# Patient Record
Sex: FEMALE | Race: WHITE | Employment: FULL TIME | URBAN - METROPOLITAN AREA
[De-identification: names, ages, dates, MRNs, and addresses within clinical notes are randomized per-mention and may not be internally consistent; named-entity substitution may affect disease eponyms.]

---

## 2018-03-06 ENCOUNTER — TELEPHONE (OUTPATIENT)
Dept: FAMILY MEDICINE CLINIC | Age: 53
End: 2018-03-06

## 2018-03-06 ENCOUNTER — OFFICE VISIT (OUTPATIENT)
Dept: FAMILY MEDICINE CLINIC | Age: 53
End: 2018-03-06

## 2018-03-06 VITALS
TEMPERATURE: 98.2 F | OXYGEN SATURATION: 98 % | HEIGHT: 68 IN | RESPIRATION RATE: 17 BRPM | DIASTOLIC BLOOD PRESSURE: 78 MMHG | BODY MASS INDEX: 33.43 KG/M2 | WEIGHT: 220.6 LBS | SYSTOLIC BLOOD PRESSURE: 125 MMHG | HEART RATE: 87 BPM

## 2018-03-06 DIAGNOSIS — R05.8 POST-VIRAL COUGH SYNDROME: Primary | ICD-10-CM

## 2018-03-06 DIAGNOSIS — Z87.09 HISTORY OF CHRONIC BRONCHITIS: ICD-10-CM

## 2018-03-06 RX ORDER — BENZONATATE 200 MG/1
200 CAPSULE ORAL
Qty: 20 CAP | Refills: 0 | Status: SHIPPED | OUTPATIENT
Start: 2018-03-06 | End: 2018-03-13

## 2018-03-06 RX ORDER — MONTELUKAST SODIUM 10 MG/1
10 TABLET ORAL DAILY
Qty: 10 TAB | Refills: 0 | Status: SHIPPED | OUTPATIENT
Start: 2018-03-06 | End: 2018-03-26 | Stop reason: SDUPTHER

## 2018-03-06 NOTE — PROGRESS NOTES
Chief Complaint   Patient presents with    URI     [facility] 2 weeks ago--neg for flu and bronchitis     1. Have you been to the ER, urgent care clinic since your last visit? Hospitalized since your last visit? No    2. Have you seen or consulted any other health care providers outside of the [address] [place] [address] since your last visit? Include any pap smears or colon screening.  No      Had chest xray--bronchitis--last friday    Hard time taking a deep breath

## 2018-03-06 NOTE — TELEPHONE ENCOUNTER
Patient has appt with Pulmonary Disease    Dr Fely Valentino  April 10th at 10:45am  Phone [phone]  Fax [phone]

## 2018-03-06 NOTE — PATIENT INSTRUCTIONS
Viral Respiratory Infection: Care Instructions  Your Care Instructions    Viruses are very small organisms. They grow in number after they enter your body. There are many types that cause different illnesses, such as colds and the mumps. The symptoms of a viral respiratory infection often start quickly. They include a fever, sore throat, and runny nose. You may also just not feel well. Or you may not want to eat much. Most viral respiratory infections are not serious. They usually get better with time and self-care. Antibiotics are not used to treat a viral infection. That's because antibiotics will not help cure a viral illness. In some cases, antiviral medicine can help your body fight a serious viral infection. Follow-up care is a key part of your treatment and safety. Be sure to make and go to all appointments, and call your doctor if you are having problems. It's also a good idea to know your test results and keep a list of the medicines you take. How can you care for yourself at home? · Rest as much as possible until you feel better. · Be safe with medicines. Take your medicine exactly as prescribed. Call your doctor if you think you are having a problem with your medicine. You will get more details on the specific medicine your doctor prescribes. · Take an over-the-counter pain medicine, such as acetaminophen (Tylenol), ibuprofen (Advil, Motrin), or naproxen (Aleve), as needed for pain and fever. Read and follow all instructions on the label. Do not give aspirin to anyone younger than 20. It has been linked to Reye syndrome, a serious illness. · Drink plenty of fluids, enough so that your urine is light yellow or clear like water. Hot fluids, such as tea or soup, may help relieve congestion in your nose and throat. If you have kidney, heart, or liver disease and have to limit fluids, talk with your doctor before you increase the amount of fluids you drink.   · Try to clear mucus from your lungs by breathing deeply and coughing. · Gargle with warm salt water once an hour. This can help reduce swelling and throat pain. Use 1 teaspoon of salt mixed in 1 cup of warm water. · Do not smoke or allow others to smoke around you. If you need help quitting, talk to your doctor about stop-smoking programs and medicines. These can increase your chances of quitting for good. To avoid spreading the virus  · Cough or sneeze into a tissue. Then throw the tissue away. · If you don't have a tissue, use your hand to cover your cough or sneeze. Then clean your hand. You can also cough into your sleeve. · Wash your hands often. Use soap and warm water. Wash for 15 to 20 seconds each time. · If you don't have soap and water near you, you can clean your hands with alcohol wipes or gel. When should you call for help? Call your doctor now or seek immediate medical care if:  ? · You have a new or higher fever. ? · Your fever lasts more than 48 hours. ? · You have trouble breathing. ? · You have a fever with a stiff neck or a severe headache. ? · You are sensitive to light. ? · You feel very sleepy or confused. ? Watch closely for changes in your health, and be sure to contact your doctor if:  ? · You do not get better as expected. Where can you learn more? Go to http://naty-abdulaziz.info/. Enter F771 in the search box to learn more about \"Viral Respiratory Infection: Care Instructions. \"  Current as of: May 12, 2017  Content Version: 11.4  © 0440-0228 [employer]. Care instructions adapted under license by [employer] (which disclaims liability or warranty for this information). If you have questions about a medical condition or this instruction, always ask your healthcare professional. [address] any warranty or liability for your use of this information.

## 2018-03-06 NOTE — MR AVS SNAPSHOT
[address] 
[phone] Patient: Joi Ramos MRN: [id number] PYI:[id number] Visit Information Date & Time Provider Department Dept. Phone Encounter #  
 3/6/2018  1:30 PM Lacey Be, [facility] [phone] [id number] Upcoming Health Maintenance Date Due  
 BREAST CANCER SCRN MAMMOGRAM 9/13/2015 FOBT Q 1 YEAR AGE 50-75 9/13/2015 Influenza Age 5 to Adult 8/1/2017 PAP AKA CERVICAL CYTOLOGY 6/4/2018 DTaP/Tdap/Td series (2 - Td) 6/4/2025 Allergies as of 3/6/2018  Review Complete On: 3/6/2018 By: Magali Knight MD  
  
 Severity Noted Reaction Type Reactions Imitrex [Sumatriptan Succinate]  07/15/2010    Unknown (comments) Morphine  12/21/2011    Itching Sulfa (Sulfonamide Antibiotics)  07/15/2010    Nausea Only, Other (comments) \"dizziness\" Tetracycline  07/15/2010    Nausea Only, Other (comments) \"dizzness\" Current Immunizations  Never Reviewed Name Date Pneumococcal Polysaccharide (PPSV-23) 6/4/2015 Tdap 6/4/2015 Not reviewed this visit You Were Diagnosed With   
  
 Codes Comments Post-viral cough syndrome    -  Primary ICD-10-CM: N49 ICD-9-CM: 786.2 History of chronic bronchitis     ICD-10-CM: Z87.09 
ICD-9-CM: V12.69 Vitals BP Pulse Temp Resp Height(growth percentile) Weight(growth percentile) 125/78 87 98.2 °F (36.8 °C) (Oral) 17 5' 8\" (1.727 m) 220 lb 9.6 oz (100.1 kg) SpO2 BMI OB Status Smoking Status 98% 33.54 kg/m2 Hysterectomy Former Smoker Vitals History BMI and BSA Data Body Mass Index Body Surface Area  
 33.54 kg/m 2 2.19 m 2 Preferred Pharmacy Pharmacy Name Phone [facility] [address] [place] [address] [phone] Your Updated Medication List  
  
   
 This list is accurate as of 3/6/18  2:08 PM.  Always use your most recent med list.  
  
  
  
  
 benzonatate 200 mg capsule Commonly known as:  TESSALON Take 1 Cap by mouth three (3) times daily as needed for Cough for up to 7 days. citalopram 20 mg tablet Commonly known as:  Arna Spurling Take 1 Tab by mouth daily. montelukast 10 mg tablet Commonly known as:  SINGULAIR Take 1 Tab by mouth daily. Prescriptions Sent to Pharmacy Refills  
 benzonatate (TESSALON) 200 mg capsule 0 Sig: Take 1 Cap by mouth three (3) times daily as needed for Cough for up to 7 days. Class: Normal  
 Pharmacy: [facility] [address] [place] [address] Ph #: [phone] Route: Oral  
 montelukast (SINGULAIR) 10 mg tablet 0 Sig: Take 1 Tab by mouth daily. Class: Normal  
 Pharmacy: [facility] [address] [place] [address] Ph #: [phone] Route: Oral  
  
We Performed the Following REFERRAL TO PULMONARY DISEASE [MYV25 Custom] Referral Information Referral ID Referred By Referred To  
  
 [id number] Maria Guadalupe PIERCE Not Available Visits Status Start Date End Date 1 New Request 3/6/18 3/6/19 If your referral has a status of pending review or denied, additional information will be sent to support the outcome of this decision. Patient Instructions Viral Respiratory Infection: Care Instructions Your Care Instructions Viruses are very small organisms. They grow in number after they enter your body. There are many types that cause different illnesses, such as colds and the mumps. The symptoms of a viral respiratory infection often start quickly. They include a fever, sore throat, and runny nose. You may also just not feel well. Or you may not want to eat much. Most viral respiratory infections are not serious. They usually get better with time and self-care. Antibiotics are not used to treat a viral infection. That's because antibiotics will not help cure a viral illness. In some cases, antiviral medicine can help your body fight a serious viral infection. Follow-up care is a key part of your treatment and safety. Be sure to make and go to all appointments, and call your doctor if you are having problems. It's also a good idea to know your test results and keep a list of the medicines you take. How can you care for yourself at home? · Rest as much as possible until you feel better. · Be safe with medicines. Take your medicine exactly as prescribed. Call your doctor if you think you are having a problem with your medicine. You will get more details on the specific medicine your doctor prescribes. · Take an over-the-counter pain medicine, such as acetaminophen (Tylenol), ibuprofen (Advil, Motrin), or naproxen (Aleve), as needed for pain and fever. Read and follow all instructions on the label. Do not give aspirin to anyone younger than 20. It has been linked to Reye syndrome, a serious illness. · Drink plenty of fluids, enough so that your urine is light yellow or clear like water. Hot fluids, such as tea or soup, may help relieve congestion in your nose and throat. If you have kidney, heart, or liver disease and have to limit fluids, talk with your doctor before you increase the amount of fluids you drink. · Try to clear mucus from your lungs by breathing deeply and coughing. · Gargle with warm salt water once an hour. This can help reduce swelling and throat pain. Use 1 teaspoon of salt mixed in 1 cup of warm water. · Do not smoke or allow others to smoke around you. If you need help quitting, talk to your doctor about stop-smoking programs and medicines. These can increase your chances of quitting for good. To avoid spreading the virus · Cough or sneeze into a tissue. Then throw the tissue away. · If you don't have a tissue, use your hand to cover your cough or sneeze. Then clean your hand. You can also cough into your sleeve. · Wash your hands often. Use soap and warm water. Wash for 15 to 20 seconds each time. · If you don't have soap and water near you, you can clean your hands with alcohol wipes or gel. When should you call for help? Call your doctor now or seek immediate medical care if: 
? · You have a new or higher fever. ? · Your fever lasts more than 48 hours. ? · You have trouble breathing. ? · You have a fever with a stiff neck or a severe headache. ? · You are sensitive to light. ? · You feel very sleepy or confused. ? Watch closely for changes in your health, and be sure to contact your doctor if: 
? · You do not get better as expected. Where can you learn more? Go to http://naty-abdulaziz.info/. Enter J696 in the search box to learn more about \"Viral Respiratory Infection: Care Instructions. \" Current as of: May 12, 2017 Content Version: 11.4 © 0049-6104 [employer]. Care instructions adapted under license by [employer] (which disclaims liability or warranty for this information). If you have questions about a medical condition or this instruction, always ask your healthcare professional. [address] any warranty or liability for your use of this information. Introducing \[facility] [place] [facility]\"" & HEALTH SERVICES! Santos Bernal introduces Experifun patient portal. Now you can access parts of your medical record, email your doctor's office, and request medication refills online. 1. In your internet browser, go to [email or web address] 2. Click on the First Time User? Click Here link in the Sign In box. You will see the New Member Sign Up page. 3. Enter your Experifun Access Code exactly as it appears below. You will not need to use this code after youve completed the sign-up process.  If you do not sign up before the expiration date, you must request a new code. · Zumper Access Code: [facility] Expires: 6/4/2018  2:08 PM 
 
4. Enter the last four digits of your Social Security Number (xxxx) and Date of Birth (mm/dd/yyyy) as indicated and click Submit. You will be taken to the next sign-up page. 5. Create a Zumper ID. This will be your Zumper login ID and cannot be changed, so think of one that is secure and easy to remember. 6. Create a Zumper password. You can change your password at any time. 7. Enter your Password Reset Question and Answer. This can be used at a later time if you forget your password. 8. Enter your e-mail address. You will receive e-mail notification when new information is available in [address] 9. Click Sign Up. You can now view and download portions of your medical record. 10. Click the Download Summary menu link to download a portable copy of your medical information. If you have questions, please visit the Frequently Asked Questions section of the Zumper website. Remember, Zumper is NOT to be used for urgent needs. For medical emergencies, dial 911. Now available from your iPhone and Android! Please provide this summary of care documentation to your next provider. Your primary care clinician is listed as Parminder Zeng. If you have any questions after today's visit, please call [phone].

## 2018-03-06 NOTE — PROGRESS NOTES
HPI     Chief Complaint   Patient presents with    URI     [facility] 2 weeks ago--neg for flu and bronchitis    Cough     She is a 46 y.o. female who presents for cough for 2 weeks. She was initially seen at urgent care and was given azithtromycin. She felt better. However a few days later, she was seen at an urgent care on 3/2/18 and was diagnosed with bronchitis. Her flu test was negative. Her CXR showed chronic perihilar changes. She was given prednisone, albuterol inhaler and tessalon pearls. Today she complains of nonproductive cough and subjective shortness of breath. She states there is no improvement with prednisone and albuterol inhaler. No fever or chills. She went to the gym yesterday and did cross fit without any problems. She notes remote history of cigarette use. Records obtained and reviewed from [facility] - as stated above. Review of Systems   Constitutional: Negative for chills, diaphoresis, fatigue and fever. HENT: Negative for congestion, postnasal drip and sore throat. Eyes: Negative for discharge. Respiratory: Positive for cough and shortness of breath. Negative for chest tightness and wheezing. Cardiovascular: Negative for chest pain and palpitations. Gastrointestinal: Negative for abdominal pain, diarrhea, nausea and vomiting. Musculoskeletal: Negative for myalgias. Skin: Negative for color change. Neurological: Negative for headaches. Psychiatric/Behavioral: Negative for agitation. Physical Exam:  Visit Vitals    /78    Pulse 87    Temp 98.2 °F (36.8 °C) (Oral)    Resp 17    Ht 5' 8\" (1.727 m)    Wt 220 lb 9.6 oz (100.1 kg)    SpO2 98%    BMI 33.54 kg/m2     Physical Exam   Constitutional: She is oriented to person, place, and time. She appears well-developed and well-nourished. No distress. HENT:   Head: Normocephalic and atraumatic.    Right Ear: External ear normal.   Left Ear: External ear normal.   Nose: Nose normal.   Mouth/Throat: Oropharynx is clear and moist. No oropharyngeal exudate. Eyes: Conjunctivae are normal.   Neck: Neck supple. Cardiovascular: Normal rate and regular rhythm. No murmur heard. Pulmonary/Chest: Effort normal and breath sounds normal. She has no wheezes. She has no rales. Abdominal: Soft. Bowel sounds are normal. She exhibits no distension. There is no tenderness. Neurological: She is alert and oriented to person, place, and time. Skin: Skin is warm and dry. Psychiatric: She has a normal mood and affect. Vitals reviewed. Assessment / Plan   Diagnoses and all orders for this visit:    1. Post-viral cough syndrome  -     benzonatate (TESSALON) 200 mg capsule; Take 1 Cap by mouth three (3) times daily as needed for Cough for up to 7 days. -     montelukast (SINGULAIR) 10 mg tablet; Take 1 Tab by mouth daily. 2. History of chronic bronchitis - chronic perihilar findings on CXR  -     REFERRAL TO PULMONARY DISEASE       Follow-up Disposition:  Return if symptoms worsen or fail to improve. I have discussed the diagnosis with the patient and the intended plan as seen in the above orders. The patient has received an after-visit summary and questions were answered concerning future plans. I have discussed medication side effects and warnings with the patient as well.     Any Love MD  Family Medicine Resident

## 2018-03-26 DIAGNOSIS — R05.8 POST-VIRAL COUGH SYNDROME: ICD-10-CM

## 2018-03-27 RX ORDER — MONTELUKAST SODIUM 10 MG/1
TABLET ORAL
Qty: 10 TAB | Refills: 0 | Status: SHIPPED | OUTPATIENT
Start: 2018-03-27

## 2018-07-16 ENCOUNTER — DOCUMENTATION ONLY (OUTPATIENT)
Dept: FAMILY MEDICINE CLINIC | Age: 53
End: 2018-07-16

## 2018-07-16 NOTE — PROGRESS NOTES
7/16/2018 5:19 PM    Evaluated by Anny Mijares 7/10/18. Instructed to continue Breo Ellipta 200-25mcg/INR 1 puff daily for SOB, cough.

## 2022-07-15 ENCOUNTER — OFFICE VISIT (OUTPATIENT)
Dept: ORTHOPEDIC SURGERY | Age: 57
End: 2022-07-15
Payer: COMMERCIAL

## 2022-07-15 VITALS — BODY MASS INDEX: 31.35 KG/M2 | HEIGHT: 70 IN | WEIGHT: 219 LBS

## 2022-07-15 DIAGNOSIS — S83.241A ACUTE MEDIAL MENISCUS TEAR OF RIGHT KNEE, INITIAL ENCOUNTER: ICD-10-CM

## 2022-07-15 DIAGNOSIS — M22.41 CHONDROMALACIA OF RIGHT PATELLA: ICD-10-CM

## 2022-07-15 DIAGNOSIS — M25.561 RIGHT KNEE PAIN, UNSPECIFIED CHRONICITY: Primary | ICD-10-CM

## 2022-07-15 PROCEDURE — 99213 OFFICE O/P EST LOW 20 MIN: CPT | Performed by: ORTHOPAEDIC SURGERY

## 2022-07-15 RX ORDER — ZINC GLUCONATE 50 MG
TABLET ORAL
COMMUNITY

## 2022-07-15 NOTE — PROGRESS NOTES
Curtis Michel (: 1965) is a 64 y.o. female, established patient, here for evaluation of the following chief complaint(s):  Knee Pain       ASSESSMENT/PLAN:  Below is the assessment and plan developed based on review of pertinent history, physical exam, labs, studies, and medications. Findings were discussed with the patient today. We will obtain an MRI which will help us with further treatment planning including the possibility of surgical treatment. Patient will follow up after this MRI is performed. 1. Right knee pain, unspecified chronicity  -     XR KNEE RT 3 V; Future  2. Acute medial meniscus tear of right knee, initial encounter  3. Chondromalacia of right patella      Return for imaging results after study performed. SUBJECTIVE/OBJECTIVE:  Curtis Michel (: 1965) is a 64 y.o. female. She is very active at the gym and notes that she was doing a squat recently when she felt a sharp tear in her knee. This caused sharp aching anterior medial knee pain. Now she has difficulty with any sort of twist or pivot activities. She has tried activity modifications, ice, and anti-inflammatories with minimal relief. She has a history of a right knee arthroscopy. Allergies   Allergen Reactions    Imitrex [Sumatriptan Succinate] Unknown (comments)    Morphine Itching    Sulfa (Sulfonamide Antibiotics) Nausea Only and Other (comments)     \"dizziness\"    Tetracycline Nausea Only and Other (comments)     \"dizzness\"       Current Outpatient Medications   Medication Sig    fluticasone propion/salmeterol (WIXELA INHUB IN) Take  by inhalation.  Magnesium Oxide 500 mg cap Take  by mouth.  citalopram (CELEXA) 20 mg tablet Take 1 Tab by mouth daily.  montelukast (SINGULAIR) 10 mg tablet TAKE 1 TABLET BY MOUTH EVERY DAY (Patient not taking: Reported on 7/15/2022)     No current facility-administered medications for this visit.        Social History     Socioeconomic History    Marital status:      Spouse name: Not on file    Number of children: Not on file    Years of education: Not on file    Highest education level: Not on file   Occupational History    Not on file   Tobacco Use    Smoking status: Former Smoker    Smokeless tobacco: Never Used   Substance and Sexual Activity    Alcohol use: No    Drug use: No    Sexual activity: Not on file   Other Topics Concern    Not on file   Social History Narrative    Not on file     Social Determinants of Health     Financial Resource Strain:     Difficulty of Paying Living Expenses: Not on file   Food Insecurity:     Worried About [address] in the Last Year: Not on file    [address] in the Last Year: Not on file   Transportation Needs:     Lack of Transportation (Medical): Not on file    Lack of Transportation (Non-Medical):  Not on file   Physical Activity:     Days of Exercise per Week: Not on file    Minutes of Exercise per Session: Not on file   Stress:     Feeling of Stress : Not on file   Social Connections:     Frequency of Communication with Friends and Family: Not on file    Frequency of Social Gatherings with Friends and Family: Not on file    Attends Restorationism Services: Not on file    Active Member of [address] [place] [address] or Organizations: Not on file    Attends Club or Organization Meetings: Not on file    Marital Status: Not on file   Intimate Partner Violence:     Fear of Current or Ex-Partner: Not on file    Emotionally Abused: Not on file    Physically Abused: Not on file    Sexually Abused: Not on file   Housing Stability:     Unable to Pay for Housing in the Last Year: Not on file    Number of [place] in the Last Year: Not on file    Unstable Housing in the Last Year: Not on file       Past Surgical History:   Procedure Laterality Date    HX HYSTERECTOMY      HX WISDOM TEETH EXTRACTION         Family History   Problem Relation Age of Onset    Hypertension Father    Philomena Alcohol abuse Father     Cancer Maternal Aunt     Cancer Maternal Grandmother     OSTEOARTHRITIS Maternal Grandmother         OB History    No obstetric history on file. REVIEW OF SYSTEMS:  ROS     Positive for: Musculoskeletal    Last edited by Mihir Hernandez on 7/15/2022  1:54 PM. (History)        Patient denies any recent fever, chills, nausea, vomiting, chest pain, or shortness of breath. Vitals:  Ht 5' 10\" (1.778 m)   Wt 219 lb (99.3 kg)   BMI 31.42 kg/m²    Body mass index is 31.42 kg/m². PHYSICAL EXAM:  General exam: Patient is awake, alert, and oriented x3. Well-appearing. No acute distress. Ambulates with an antalgic gait    Right knee: Neurovascular and sensory intact. There is tenderness to palpation along the medial joint line. Mild effusion is present. There is pain with Deacon's maneuver. No obvious instability on ligamentous testing including Lachman's exam.  Stable anterior and posterior drawer. No erythema or ecchymosis. IMAGING:    XR Results (most recent):  Results from Hospital Encounter encounter on 01/23/09    XR LUMBAR SPINE 2 OR 3 VIEWS    Narrative            ICD Codes / Adm. Diagnosis:    /   BACK PAIN  Examination:  Mily Jung  - [id number] - Jan 23 2009  8:55AM    Accession No:  [id number]  Reason:  REASON: PAIN      REPORT:  Three views of the lumbar spine demonstrate normal alignment without  evidence of acute fracture or subluxation. IMPRESSION: No acute process. Interpreting/Reading Doctor: Katalina Borrego ([id number])  Transcribed: n/a on 01/23/2009  Approved: Katalina Borrego ([id number])  01/23/2009        Distribution:  Attending Doctor: Jody Pardo Doctor: Candelario Owens         Orders Placed This Encounter    XR KNEE RT 3 V     1     Standing Status:   Future     Number of Occurrences:   1     Standing Expiration Date:   7/16/2023              An electronic signature was used to authenticate this note.   -- Christian Bryant DO

## 2022-07-15 NOTE — LETTER
7/15/2022    Patient: Keisha Ventura   YOB: 1965   Date of Visit: 7/15/2022     Marcela Russell MD  [address] [address]  Via Fax: [phone]    Dear Marcela Russell MD,      Thank you for referring Ms. Gee Mcleod to [facility] for evaluation. My notes for this consultation are attached. If you have questions, please do not hesitate to call me. I look forward to following your patient along with you.       Sincerely,    Elza Lassiter, DO

## 2022-07-29 ENCOUNTER — HOSPITAL ENCOUNTER (OUTPATIENT)
Dept: MRI IMAGING | Age: 57
Discharge: HOME | End: 2022-07-29
Attending: ORTHOPAEDIC SURGERY
Payer: COMMERCIAL

## 2022-07-29 DIAGNOSIS — S83.241A ACUTE MEDIAL MENISCUS TEAR OF RIGHT KNEE, INITIAL ENCOUNTER: ICD-10-CM

## 2022-07-29 PROCEDURE — 73721 MRI JNT OF LWR EXTRE W/O DYE: CPT

## 2022-08-08 ENCOUNTER — OFFICE VISIT (OUTPATIENT)
Dept: ORTHOPEDIC SURGERY | Age: 57
End: 2022-08-08
Payer: COMMERCIAL

## 2022-08-08 VITALS — WEIGHT: 219 LBS | HEIGHT: 70 IN | BODY MASS INDEX: 31.35 KG/M2

## 2022-08-08 DIAGNOSIS — S83.281A ACUTE LATERAL MENISCAL TEAR, RIGHT, INITIAL ENCOUNTER: ICD-10-CM

## 2022-08-08 DIAGNOSIS — M22.41 CHONDROMALACIA OF RIGHT PATELLA: Primary | ICD-10-CM

## 2022-08-08 PROCEDURE — 20610 DRAIN/INJ JOINT/BURSA W/O US: CPT | Performed by: ORTHOPAEDIC SURGERY

## 2022-08-08 RX ORDER — AMOXICILLIN 875 MG/1
TABLET, FILM COATED ORAL
COMMUNITY

## 2022-08-08 RX ORDER — IBUPROFEN 800 MG/1
TABLET ORAL
COMMUNITY

## 2022-08-08 RX ORDER — CEFDINIR 300 MG/1
CAPSULE ORAL
COMMUNITY

## 2022-08-08 RX ORDER — DOXYCYCLINE 100 MG/1
CAPSULE ORAL
COMMUNITY

## 2022-08-08 RX ORDER — TRIAMCINOLONE ACETONIDE 40 MG/ML
40 INJECTION, SUSPENSION INTRA-ARTICULAR; INTRAMUSCULAR ONCE
Status: COMPLETED | OUTPATIENT
Start: 2022-08-08 | End: 2022-08-08

## 2022-08-08 RX ORDER — ALBUTEROL SULFATE 90 UG/1
AEROSOL, METERED RESPIRATORY (INHALATION)
COMMUNITY

## 2022-08-08 RX ORDER — BUPIVACAINE HYDROCHLORIDE 2.5 MG/ML
6 INJECTION, SOLUTION INFILTRATION; PERINEURAL ONCE
Status: COMPLETED | OUTPATIENT
Start: 2022-08-08 | End: 2022-08-08

## 2022-08-08 RX ORDER — DICLOFENAC SODIUM 75 MG/1
TABLET, DELAYED RELEASE ORAL
COMMUNITY
Start: 2022-08-03

## 2022-08-08 RX ORDER — FLUTICASONE FUROATE AND VILANTEROL 100; 25 UG/1; UG/1
POWDER RESPIRATORY (INHALATION)
COMMUNITY

## 2022-08-08 RX ADMIN — BUPIVACAINE HYDROCHLORIDE 15 MG: 2.5 INJECTION, SOLUTION INFILTRATION; PERINEURAL at 16:07

## 2022-08-08 RX ADMIN — TRIAMCINOLONE ACETONIDE 40 MG: 40 INJECTION, SUSPENSION INTRA-ARTICULAR; INTRAMUSCULAR at 16:07

## 2022-08-08 NOTE — PROGRESS NOTES
Eloisa Bence (: 1965) is a 64 y.o. female, established patient, here for evaluation of the following chief complaint(s):  Knee Pain (Right knee MRI results)       ASSESSMENT/PLAN:  Below is the assessment and plan developed based on review of pertinent history, physical exam, labs, studies, and medications. She elected to try corticosteroid injection for the right knee today. Most of her symptoms seem to be really related to her chondral changes at the anterior medial knee and at the patellofemoral joint. We discussed the risks and benefits of injection and informed consent was obtained. After a sterile preparation, 6 cc of bupivicaine and 40 mg of Kenalog were injected into the right knee. The patient tolerated the procedure well and there were no complications. Post injection pain, blood sugar elevation, skin discoloration, fatty atrophy and the signs of infection were discussed in detail. The patient was instructed to contact us if there were any questions or concerns prior to their follow up appointment. 1. Chondromalacia of right patella  2. Acute lateral meniscal tear, right, initial encounter      Return in about 3 months (around 2022). SUBJECTIVE/OBJECTIVE:  Eloisa Bence (: 1965) is a 64 y.o. female. She notes continued aching pain and clicking in the right knee. She has difficulty with squats and lunges. She has had to modify her weightlifting routine.         Allergies   Allergen Reactions    Imitrex [Sumatriptan Succinate] Unknown (comments)    Morphine Itching    Sulfa (Sulfonamide Antibiotics) Nausea Only and Other (comments)     \"dizziness\"    Tetracycline Nausea Only and Other (comments)     \"dizzness\"       Current Outpatient Medications   Medication Sig    albuterol (PROVENTIL HFA, VENTOLIN HFA, PROAIR HFA) 90 mcg/actuation inhaler albuterol sulfate HFA 90 mcg/actuation aerosol inhaler   2 (TWO) PUFF EVERY 4-6 HOURS AS NEEDED    amoxicillin (AMOXIL) 875 mg tablet amoxicillin 875 mg tablet   TAKE 1 TABLET BY MOUTH TWICE DAILY UNTIL GONE    cefdinir (OMNICEF) 300 mg capsule cefdinir 300 mg capsule    diclofenac EC (VOLTAREN) 75 mg EC tablet     doxycycline (VIBRAMYCIN) 100 mg capsule doxycycline hyclate 100 mg capsule   TAKE 1 CAPSULE BY MOUTH TWICE A DAY    fluticasone furoate-vilanteroL (BREO ELLIPTA) 100-25 mcg/dose inhaler Breo Ellipta 100 mcg-25 mcg/dose powder for inhalation    ibuprofen (MOTRIN) 800 mg tablet ibuprofen 800 mg tablet   TAKE 1 TABLET BY MOUTH EVERY 6 TO 8 HOURS AS NEEDED FOR PAIN    fluticasone propion/salmeterol (WIXELA INHUB IN) Take  by inhalation. Magnesium Oxide 500 mg cap Take  by mouth.    montelukast (SINGULAIR) 10 mg tablet TAKE 1 TABLET BY MOUTH EVERY DAY    citalopram (CELEXA) 20 mg tablet Take 1 Tab by mouth daily. No current facility-administered medications for this visit.        Social History     Socioeconomic History    Marital status:      Spouse name: Not on file    Number of children: Not on file    Years of education: Not on file    Highest education level: Not on file   Occupational History    Not on file   Tobacco Use    Smoking status: Former    Smokeless tobacco: Never   Substance and Sexual Activity    Alcohol use: No    Drug use: No    Sexual activity: Not on file   Other Topics Concern    Not on file   Social History Narrative    Not on file     Social Determinants of Health     Financial Resource Strain: Not on file   Food Insecurity: Not on file   Transportation Needs: Not on file   Physical Activity: Not on file   Stress: Not on file   Social Connections: Not on file   Intimate Partner Violence: Not on file   Housing Stability: Not on file       Past Surgical History:   Procedure Laterality Date    HX HYSTERECTOMY      HX WISDOM TEETH EXTRACTION         Family History   Problem Relation Age of Onset    Hypertension Father     Alcohol abuse Father     Cancer Maternal Aunt     Cancer Maternal Grandmother     OSTEOARTHRITIS Maternal Grandmother         OB History    No obstetric history on file. REVIEW OF SYSTEMS:    Patient denies any recent fever, chills, nausea, vomiting, chest pain, or shortness of breath. Vitals:  Ht 5' 10\" (1.778 m)   Wt 219 lb (99.3 kg)   BMI 31.42 kg/m²    Body mass index is 31.42 kg/m². PHYSICAL EXAM:  General exam: Patient is awake, alert, and oriented x3. Well-appearing. No acute distress. Ambulates with an antalgic gait    Right knee: Neurovascular and sensory intact. There is tenderness to palpation in the parapatellar region. There is crepitus with range of motion. No significant effusion noted. There is no joint line tenderness to palpation. Deacon's maneuver is nonpainful. Normal stability on Lachman's exam and anterior/posterior drawer testing. No erythema or ecchymosis. IMAGING:  MRI Results (most recent):  Results from [place] encounter on 07/29/22    MRI KNEE RT WO CONT    Narrative  EXAM: MRI KNEE RT WO CONT    INDICATION: Dx: Acute medial meniscus tear of right knee, initial encounter  [S83.241A(ICD-10-CM)]    COMPARISON: Right knee radiographs 7/15/2022, MRI right knee 2/25/2021    TECHNIQUE: Axial T2 fat-saturated and proton density fat-saturated; coronal T1  and proton density fat-saturated; and sagittal T2 fat-saturated, proton density  fat-saturated, and gradient echo MRI of the right knee . CONTRAST: None. FINDINGS: Bone marrow: Tricompartment marginal osteophytes. No acute fracture,  dislocation, or marrow replacing process. Joint fluid: Increased Moderate joint effusion with synovitis. Increased  Moderate size mildly complex popliteal cyst.    Collateral ligaments and posterior, lateral corner: Intact. Medial meniscus: Intact. Lateral meniscus: New undersurface horizontal oblique tear involving the  posterior horn    ACL and PCL: Moderate mucoid degeneration of ACL.  PCL is unremarkable. Tendons: Intact. Muscles: Within normal limits. Patellofemoral alignment: Lateral patellar tilt without significant offset. Trochlear groove is not hypoplastic. TT-TG distance: 2 mm    Articular cartilage: New focal high-grade chondral fissuring with subchondral  cystic change and marrow edema in the peripheral aspect of the lateral tibial  plateau. Unchanged chondral signal heterogeneity in the weightbearing medial  compartment. Unchanged chondral signal heterogeneity and low to moderate grade  fissuring in the median ridge of the patella. Slightly worsened chondral signal  heterogeneity/fibrocartilage formation in the medial femoral trochlea. Soft tissue mass: None. Impression  1. New undersurface horizontal oblique tear of the posterior horn of the  lateral meniscus. 2.  New focal high-grade chondrosis in the lateral tibial plateau. 3.  Redemonstrated moderate mucoid degeneration of ACL. 4.  Intact medial meniscus and collateral ligaments. 5.  Increased moderate joint effusion and moderate sized popliteal cyst.      XR Results (most recent):  Results from Appointment encounter on 07/15/22    XR KNEE RT 3 V    Narrative  X-rays of the right knee 3 views done today show normal overall alignment. No signs of acute fracture. Maintained joint space      Results from Hospital Encounter encounter on 01/23/09    XR LUMBAR SPINE 2 OR 3 VIEWS              ICD Codes / Adm. Diagnosis:    /   BACK PAIN  Examination:  [facility]  - [id number] - Jan 23 2009  8:55AM    Accession No:  [id number]  Reason:  REASON: PAIN      REPORT:  Three views of the lumbar spine demonstrate normal alignment without  evidence of acute fracture or subluxation. IMPRESSION: No acute process.         Interpreting/Reading Doctor: Sandra Sepulveda ([id number])  Transcribed: n/a on 01/23/2009  Approved: Sandra Sepulveda ([id number])  01/23/2009        Distribution:  Attending Doctor: Dung Foote  Alternate Doctor: Emre Feliciano LAURA         No orders of the defined types were placed in this encounter. An electronic signature was used to authenticate this note.   -- Sánchez Benitez, DO

## 2024-08-28 ENCOUNTER — APPOINTMENT (OUTPATIENT)
Facility: HOSPITAL | Age: 59
End: 2024-08-28
Payer: COMMERCIAL

## 2024-08-28 ENCOUNTER — HOSPITAL ENCOUNTER (EMERGENCY)
Facility: HOSPITAL | Age: 59
Discharge: HOME | End: 2024-08-28
Attending: EMERGENCY MEDICINE
Payer: COMMERCIAL

## 2024-08-28 VITALS
HEIGHT: 70 IN | HEART RATE: 72 BPM | OXYGEN SATURATION: 96 % | TEMPERATURE: 97.8 F | RESPIRATION RATE: 20 BRPM | WEIGHT: 232.59 LBS | SYSTOLIC BLOOD PRESSURE: 153 MMHG | BODY MASS INDEX: 33.3 KG/M2 | DIASTOLIC BLOOD PRESSURE: 86 MMHG

## 2024-08-28 DIAGNOSIS — M54.12 CERVICAL RADICULOPATHY: Primary | ICD-10-CM

## 2024-08-28 DIAGNOSIS — M79.602 LEFT ARM PAIN: ICD-10-CM

## 2024-08-28 PROCEDURE — 99284 EMERGENCY DEPT VISIT MOD MDM: CPT

## 2024-08-28 PROCEDURE — 6370000000 HC RX 637 (ALT 250 FOR IP): Performed by: EMERGENCY MEDICINE

## 2024-08-28 PROCEDURE — 6360000002 HC RX W HCPCS: Performed by: EMERGENCY MEDICINE

## 2024-08-28 PROCEDURE — 96372 THER/PROPH/DIAG INJ SC/IM: CPT

## 2024-08-28 PROCEDURE — 72125 CT NECK SPINE W/O DYE: CPT

## 2024-08-28 RX ORDER — KETOROLAC TROMETHAMINE 30 MG/ML
60 INJECTION, SOLUTION INTRAMUSCULAR; INTRAVENOUS ONCE
Status: COMPLETED | OUTPATIENT
Start: 2024-08-28 | End: 2024-08-28

## 2024-08-28 RX ORDER — METHYLPREDNISOLONE 4 MG
TABLET, DOSE PACK ORAL
Qty: 1 KIT | Refills: 0 | Status: SHIPPED | OUTPATIENT
Start: 2024-08-28 | End: 2024-09-03

## 2024-08-28 RX ORDER — NAPROXEN 500 MG/1
500 TABLET ORAL 2 TIMES DAILY WITH MEALS
Qty: 60 TABLET | Refills: 0 | Status: SHIPPED | OUTPATIENT
Start: 2024-08-28

## 2024-08-28 RX ORDER — METHOCARBAMOL 750 MG/1
750 TABLET, FILM COATED ORAL 3 TIMES DAILY
Qty: 30 TABLET | Refills: 0 | Status: SHIPPED | OUTPATIENT
Start: 2024-08-28 | End: 2024-09-07

## 2024-08-28 RX ORDER — METHOCARBAMOL 750 MG/1
750 TABLET, FILM COATED ORAL
Status: COMPLETED | OUTPATIENT
Start: 2024-08-28 | End: 2024-08-28

## 2024-08-28 RX ORDER — KETOROLAC TROMETHAMINE 30 MG/ML
60 INJECTION, SOLUTION INTRAMUSCULAR; INTRAVENOUS
Status: DISCONTINUED | OUTPATIENT
Start: 2024-08-28 | End: 2024-08-28 | Stop reason: SDUPTHER

## 2024-08-28 RX ADMIN — KETOROLAC TROMETHAMINE 60 MG: 30 INJECTION, SOLUTION INTRAMUSCULAR at 03:12

## 2024-08-28 RX ADMIN — METHOCARBAMOL TABLETS 750 MG: 750 TABLET, COATED ORAL at 03:11

## 2024-08-28 ASSESSMENT — PAIN DESCRIPTION - ORIENTATION: ORIENTATION: LEFT;LOWER

## 2024-08-28 ASSESSMENT — PAIN DESCRIPTION - LOCATION: LOCATION: ARM

## 2024-08-28 ASSESSMENT — PAIN DESCRIPTION - DESCRIPTORS: DESCRIPTORS: BURNING;THROBBING;PRESSURE

## 2024-08-28 ASSESSMENT — PAIN SCALES - GENERAL: PAINLEVEL_OUTOF10: 8

## 2024-08-28 NOTE — ED NOTES
Condition Stable  Patient discharged to home  Patient education was completed  Education taught to patient  Teaching method used was handout and verbal  Understanding of teaching was good  Patient was discharged ambulatory  Discharged with family  Valuables were given to patient remained in possession of belongings during stay

## 2024-08-28 NOTE — ED NOTES
Patient returned from CT via wheelchair. Patient ambulatory to restroom without need for assistance or incident.

## 2024-08-28 NOTE — ED TRIAGE NOTES
Patient arrives with complaint of left arm pain/numbness starting with left shoulder pain 1 week ago. Patient has history of Degenerative Disc Dz in Neck. The past two nights the patient has woken up with increased pain to left elbow down to hand with decreased . Patient asks to not be given narcotics. Last Tylenol: 10pm - unknown if normal or extra strength - 3 tabs.

## 2024-08-28 NOTE — ED PROVIDER NOTES
[facility]  EMERGENCY DEPARTMENT ENCOUNTER      Pt Name: Jaelyn Hernandez  MRN: [id number]  Birthdate 1965  Date of evaluation: 8/28/2024  Provider: Donnell Flores MD    CHIEF COMPLAINT       Chief Complaint   Patient presents with    Arm Pain         HISTORY OF PRESENT ILLNESS   (Location/Symptom, Timing/Onset, Context/Setting, Quality, Duration, Modifying Factors, Severity)  Note limiting factors.   58-year-old female with a past medical history significant for asthma, DJD of the cervical spine and depression who presents to the ER with a complaint of left shoulder pain that began this evening after she woke up from sleep as she described a burning, sharp and throbbing sensation, severity 8 out of 10, worse with movement, no relieving factors.  She denies any fever and chills, sore throat, cough or congestion, headache, neck and back pain, chest pain shortness of breath, abdominal pain, extremity weakness or numbness, sick contact, recent travel, prior history of the same.            Review of External Medical Records:     Nursing Notes were reviewed.    REVIEW OF SYSTEMS    (2-9 systems for level 4, 10 or more for level 5)     Review of Systems   All other systems reviewed and are negative.      Except as noted above the remainder of the review of systems was reviewed and negative.       PAST MEDICAL HISTORY     Past Medical History:   Diagnosis Date    Asthma     Degenerative disc disease, cervical     Depression          SURGICAL HISTORY       Past Surgical History:   Procedure Laterality Date    HYSTERECTOMY (CERVIX STATUS UNKNOWN)      WISDOM TOOTH EXTRACTION           CURRENT MEDICATIONS       Discharge Medication List as of 8/28/2024  4:11 AM        CONTINUE these medications which have NOT CHANGED    Details   calcium carbonate (OSCAL) 500 MG TABS tablet Take 1 tablet by mouth dailyHistorical Med      WIXELA INHUB 250-50 MCG/ACT AEPB diskus inhaler INHALE 1 PUFF BY MOUTH TWICE DAILY,

## 2025-03-26 NOTE — ED NOTES
Caller: JAMA   Relationship to Patient: [facility]   Phone Number: [phone]  Reason for Call:   JAMA CALLED IN TO SEE IF WE STILL HAD DRUG TEST FOR PATIENT'S APPOINTMENT TOMORROW (PURPLE BAG FROM [employer])   Patient to CT via wheelchair with CT Tech